# Patient Record
Sex: MALE | Race: WHITE | Employment: STUDENT | URBAN - METROPOLITAN AREA
[De-identification: names, ages, dates, MRNs, and addresses within clinical notes are randomized per-mention and may not be internally consistent; named-entity substitution may affect disease eponyms.]

---
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
## 2024-11-04 ENCOUNTER — TELEPHONE (OUTPATIENT)
Age: 19
End: 2024-11-04

## 2024-11-04 ENCOUNTER — OFFICE VISIT (OUTPATIENT)
Age: 19
End: 2024-11-04

## 2024-11-04 VITALS
RESPIRATION RATE: 16 BRPM | DIASTOLIC BLOOD PRESSURE: 76 MMHG | OXYGEN SATURATION: 98 % | BODY MASS INDEX: 23.66 KG/M2 | SYSTOLIC BLOOD PRESSURE: 121 MMHG | HEART RATE: 68 BPM | HEIGHT: 71 IN | WEIGHT: 169 LBS | TEMPERATURE: 98 F

## 2024-11-04 DIAGNOSIS — M54.2 NECK PAIN: ICD-10-CM

## 2024-11-04 DIAGNOSIS — T14.8XXA MUSCLE STRAIN: ICD-10-CM

## 2024-11-04 DIAGNOSIS — M54.50 LUMBAR BACK PAIN: Primary | ICD-10-CM

## 2024-11-04 RX ORDER — CYCLOBENZAPRINE HCL 5 MG
5 TABLET ORAL 2 TIMES DAILY PRN
Qty: 10 TABLET | Refills: 0 | Status: SHIPPED | OUTPATIENT
Start: 2024-11-04 | End: 2024-11-14

## 2024-11-04 RX ORDER — LIDOCAINE 4 G/G
1 PATCH TOPICAL DAILY
Qty: 10 PATCH | Refills: 0 | Status: SHIPPED | OUTPATIENT
Start: 2024-11-04 | End: 2024-11-14

## 2024-11-04 RX ORDER — KETOROLAC TROMETHAMINE 30 MG/ML
30 INJECTION, SOLUTION INTRAMUSCULAR; INTRAVENOUS ONCE
Status: COMPLETED | OUTPATIENT
Start: 2024-11-04 | End: 2024-11-04

## 2024-11-04 RX ADMIN — KETOROLAC TROMETHAMINE 30 MG: 30 INJECTION, SOLUTION INTRAMUSCULAR; INTRAVENOUS at 12:56
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
## 2024-11-04 NOTE — TELEPHONE ENCOUNTER
Per provider, tried to reach patient for results, mother answered the phone and was told he was unavailable.

## 2024-11-04 NOTE — TELEPHONE ENCOUNTER
Per provider, she tried to call to give x-ray results. Patient unavailable. Left message to have him call our office for results.

## 2024-11-04 NOTE — PATIENT INSTRUCTIONS
Thank you for visiting [facility] today.    -Muscle Relaxer (Flexeril, Robaxin, Tizanidine) should only be taken if needed, as it may cause drowsiness or dizziness.  Avoid driving.  Avoid alcohol/drugs that can also make you sleepy.  -Alternate between ice and heat.  Heat may be applied for 30 minutes at a time every 4-5 hours.  Take warm epsom salt baths and gentle stretches.  -Lidocaine patches for 12 hours on and 12 hours off.  -Alternate Ibuprofen and Tylenol  -Increase water hydration.  -Avoid lifting heavy objects.  -Back exercises once pain has been controlled    If you develop a fever, gross inability to walk/weakness, severe numbness in bilateral lower extremities, saddle paresthesias, and/or loss of bladder/bowel control, please go to the nearest emergency department for concern of cauda equina.    If you experience radiating pain or numbness in your arms or legs causing weakness, you should be evaluated by your PCP even if you don't have significant neck pain.  Difficulty with fine motor skills, such as changes in handwriting, trouble buttoning buttons, and dropping keys and cups may be a subtle sign of serious spine issues.  No matter the cause, you should seek ER care when neck pain is:  Continuous and persistent  Severe  Accompanied by pain that radiates down arms or legs  Accompanied by headaches, numbness, tingling or weakness      Please follow up with your primary care provider within 2-3 days if  your signs and symptoms have not resolved or worsened.